# Patient Record
Sex: FEMALE | Race: BLACK OR AFRICAN AMERICAN | NOT HISPANIC OR LATINO | URBAN - METROPOLITAN AREA
[De-identification: names, ages, dates, MRNs, and addresses within clinical notes are randomized per-mention and may not be internally consistent; named-entity substitution may affect disease eponyms.]

---

## 2022-05-25 ENCOUNTER — HOSPITAL ENCOUNTER (EMERGENCY)
Facility: HOSPITAL | Age: 52
Discharge: HOME | End: 2022-05-25
Attending: EMERGENCY MEDICINE

## 2022-05-25 VITALS
HEART RATE: 85 BPM | WEIGHT: 110 LBS | RESPIRATION RATE: 16 BRPM | TEMPERATURE: 98 F | OXYGEN SATURATION: 100 % | BODY MASS INDEX: 18.78 KG/M2 | HEIGHT: 64 IN | SYSTOLIC BLOOD PRESSURE: 135 MMHG | DIASTOLIC BLOOD PRESSURE: 97 MMHG

## 2022-05-25 DIAGNOSIS — E86.0 DEHYDRATION: ICD-10-CM

## 2022-05-25 DIAGNOSIS — R42 DIZZINESS: ICD-10-CM

## 2022-05-25 DIAGNOSIS — R11.10 VOMITING: ICD-10-CM

## 2022-05-25 DIAGNOSIS — N30.01 ACUTE CYSTITIS WITH HEMATURIA: Primary | ICD-10-CM

## 2022-05-25 DIAGNOSIS — R53.81 MALAISE: ICD-10-CM

## 2022-05-25 LAB
ALBUMIN SERPL BCP-MCNC: 4.4 G/DL (ref 3.5–5.2)
ALP SERPL-CCNC: 67 U/L (ref 55–135)
ALT SERPL W/O P-5'-P-CCNC: 9 U/L (ref 10–44)
ANION GAP SERPL CALC-SCNC: 13 MMOL/L (ref 8–16)
AST SERPL-CCNC: 15 U/L (ref 10–40)
BACTERIA #/AREA URNS AUTO: ABNORMAL /HPF
BASOPHILS # BLD AUTO: 0.07 K/UL (ref 0–0.2)
BASOPHILS NFR BLD: 0.8 % (ref 0–1.9)
BILIRUB SERPL-MCNC: 1.5 MG/DL (ref 0.1–1)
BILIRUB UR QL STRIP: NEGATIVE
BUN SERPL-MCNC: 6 MG/DL (ref 6–20)
CALCIUM SERPL-MCNC: 10.4 MG/DL (ref 8.7–10.5)
CHLORIDE SERPL-SCNC: 101 MMOL/L (ref 95–110)
CLARITY UR REFRACT.AUTO: CLEAR
CO2 SERPL-SCNC: 26 MMOL/L (ref 23–29)
COLOR UR AUTO: YELLOW
CREAT SERPL-MCNC: 0.7 MG/DL (ref 0.5–1.4)
CTP QC/QA: YES
CTP QC/QA: YES
DIFFERENTIAL METHOD: ABNORMAL
EOSINOPHIL # BLD AUTO: 0.1 K/UL (ref 0–0.5)
EOSINOPHIL NFR BLD: 1.2 % (ref 0–8)
ERYTHROCYTE [DISTWIDTH] IN BLOOD BY AUTOMATED COUNT: 11.5 % (ref 11.5–14.5)
EST. GFR  (AFRICAN AMERICAN): >60 ML/MIN/1.73 M^2
EST. GFR  (NON AFRICAN AMERICAN): >60 ML/MIN/1.73 M^2
GLUCOSE SERPL-MCNC: 105 MG/DL (ref 70–110)
GLUCOSE UR QL STRIP: NEGATIVE
HCT VFR BLD AUTO: 47.2 % (ref 37–48.5)
HGB BLD-MCNC: 16.4 G/DL (ref 12–16)
HGB UR QL STRIP: ABNORMAL
IMM GRANULOCYTES # BLD AUTO: 0.02 K/UL (ref 0–0.04)
IMM GRANULOCYTES NFR BLD AUTO: 0.2 % (ref 0–0.5)
KETONES UR QL STRIP: NEGATIVE
LEUKOCYTE ESTERASE UR QL STRIP: ABNORMAL
LYMPHOCYTES # BLD AUTO: 3.4 K/UL (ref 1–4.8)
LYMPHOCYTES NFR BLD: 37.1 % (ref 18–48)
MCH RBC QN AUTO: 31.5 PG (ref 27–31)
MCHC RBC AUTO-ENTMCNC: 34.7 G/DL (ref 32–36)
MCV RBC AUTO: 91 FL (ref 82–98)
MICROSCOPIC COMMENT: ABNORMAL
MONOCYTES # BLD AUTO: 0.6 K/UL (ref 0.3–1)
MONOCYTES NFR BLD: 6.1 % (ref 4–15)
NEUTROPHILS # BLD AUTO: 5.1 K/UL (ref 1.8–7.7)
NEUTROPHILS NFR BLD: 54.6 % (ref 38–73)
NITRITE UR QL STRIP: NEGATIVE
NRBC BLD-RTO: 0 /100 WBC
PH UR STRIP: 6 [PH] (ref 5–8)
PLATELET # BLD AUTO: 286 K/UL (ref 150–450)
PMV BLD AUTO: 9.1 FL (ref 9.2–12.9)
POC MOLECULAR INFLUENZA A AGN: NEGATIVE
POC MOLECULAR INFLUENZA B AGN: NEGATIVE
POTASSIUM SERPL-SCNC: 3.5 MMOL/L (ref 3.5–5.1)
PROT SERPL-MCNC: 7.9 G/DL (ref 6–8.4)
PROT UR QL STRIP: NEGATIVE
RBC # BLD AUTO: 5.2 M/UL (ref 4–5.4)
RBC #/AREA URNS AUTO: 9 /HPF (ref 0–4)
SARS-COV-2 RDRP RESP QL NAA+PROBE: NEGATIVE
SODIUM SERPL-SCNC: 140 MMOL/L (ref 136–145)
SP GR UR STRIP: 1.01 (ref 1–1.03)
SQUAMOUS #/AREA URNS AUTO: 1 /HPF
TROPONIN I SERPL DL<=0.01 NG/ML-MCNC: <0.006 NG/ML (ref 0–0.03)
TSH SERPL DL<=0.005 MIU/L-ACNC: 3.32 UIU/ML (ref 0.4–4)
URN SPEC COLLECT METH UR: ABNORMAL
WBC # BLD AUTO: 9.28 K/UL (ref 3.9–12.7)
WBC #/AREA URNS AUTO: 42 /HPF (ref 0–5)

## 2022-05-25 PROCEDURE — 93005 ELECTROCARDIOGRAM TRACING: CPT

## 2022-05-25 PROCEDURE — 96374 THER/PROPH/DIAG INJ IV PUSH: CPT

## 2022-05-25 PROCEDURE — 84484 ASSAY OF TROPONIN QUANT: CPT | Performed by: NURSE PRACTITIONER

## 2022-05-25 PROCEDURE — 99284 EMERGENCY DEPT VISIT MOD MDM: CPT | Mod: CS,,, | Performed by: NURSE PRACTITIONER

## 2022-05-25 PROCEDURE — 99284 PR EMERGENCY DEPT VISIT,LEVEL IV: ICD-10-PCS | Mod: CS,,, | Performed by: NURSE PRACTITIONER

## 2022-05-25 PROCEDURE — 85025 COMPLETE CBC W/AUTO DIFF WBC: CPT | Performed by: NURSE PRACTITIONER

## 2022-05-25 PROCEDURE — 81001 URINALYSIS AUTO W/SCOPE: CPT | Performed by: NURSE PRACTITIONER

## 2022-05-25 PROCEDURE — 87502 INFLUENZA DNA AMP PROBE: CPT

## 2022-05-25 PROCEDURE — 93010 EKG 12-LEAD: ICD-10-PCS | Mod: ,,, | Performed by: INTERNAL MEDICINE

## 2022-05-25 PROCEDURE — 93010 ELECTROCARDIOGRAM REPORT: CPT | Mod: ,,, | Performed by: INTERNAL MEDICINE

## 2022-05-25 PROCEDURE — 99285 EMERGENCY DEPT VISIT HI MDM: CPT | Mod: 25

## 2022-05-25 PROCEDURE — 84443 ASSAY THYROID STIM HORMONE: CPT | Performed by: NURSE PRACTITIONER

## 2022-05-25 PROCEDURE — U0002 COVID-19 LAB TEST NON-CDC: HCPCS | Performed by: NURSE PRACTITIONER

## 2022-05-25 PROCEDURE — 63600175 PHARM REV CODE 636 W HCPCS: Performed by: NURSE PRACTITIONER

## 2022-05-25 PROCEDURE — 87086 URINE CULTURE/COLONY COUNT: CPT | Performed by: NURSE PRACTITIONER

## 2022-05-25 PROCEDURE — 25000003 PHARM REV CODE 250: Performed by: NURSE PRACTITIONER

## 2022-05-25 PROCEDURE — 80053 COMPREHEN METABOLIC PANEL: CPT | Performed by: NURSE PRACTITIONER

## 2022-05-25 RX ORDER — CEPHALEXIN 500 MG/1
500 CAPSULE ORAL EVERY 12 HOURS
Qty: 10 CAPSULE | Refills: 0 | Status: SHIPPED | OUTPATIENT
Start: 2022-05-25 | End: 2022-05-30

## 2022-05-25 RX ORDER — AMLODIPINE BESYLATE 5 MG/1
5 TABLET ORAL DAILY
Qty: 30 TABLET | Refills: 0 | Status: SHIPPED | OUTPATIENT
Start: 2022-05-25 | End: 2022-06-24

## 2022-05-25 RX ADMIN — SODIUM CHLORIDE 1000 ML: 0.9 INJECTION, SOLUTION INTRAVENOUS at 10:05

## 2022-05-25 RX ADMIN — CEFTRIAXONE 1 G: 1 INJECTION, SOLUTION INTRAVENOUS at 10:05

## 2022-05-26 NOTE — ED TRIAGE NOTES
"Zara Monte, a 51 y.o. female presents to the ED w/ complaint of "feeling off." pt states about 2 weeks ago she had a few episodes of vomiting. Pt c/o dizziness, burred vision, and headache.     Triage note:  Chief Complaint   Patient presents with    Headache     Pt reports headache that started today. +nausea, + intermittent dizziness with blurry vision.       Review of patient's allergies indicates:  No Known Allergies  No past medical history on file.    "

## 2022-05-26 NOTE — DISCHARGE INSTRUCTIONS
You were seen and evaluated in the ER today.  Your workup shows that you have a urinary tract infection and your slightly dehydrated.  We have given you fluids and antibiotics in the ED.  We are going to discharge you with antibiotics.  Please take as prescribed.  Please increase your fluids over the next few days.  Please follow-up with your PCP as needed.  Please return to the ED for any worsening symptoms such as chest pain, shortness of breath, fever not controlled with Tylenol or ibuprofen or uncontrolled pain.      Our goal in the emergency department is to always give you outstanding care and exceptional service. You may receive a survey by mail or e-mail in the next week regarding your experience in our ED. We would greatly appreciate your completing and returning the survey. Your feedback provides us with a way to recognize our staff who give very good care and it helps us learn how to improve when your experience was below our aspiration of excellence.

## 2022-05-26 NOTE — ED PROVIDER NOTES
"Source of History:  Patient    Chief complaint:  Headache (Pt reports headache that started today. +nausea, + intermittent dizziness with blurry vision.  )      HPI:  Zara Monte is a 51 y.o. female presenting with nausea, intermittent dizziness and generalized malaise for the past few weeks, worse over the past week. Pt does endorse nausea and vomiting intermittently over the past week.  Pt is not covid or influenza vaccinated.  Pt denies any sick contacts    This is the extent to the patients complaints today here in the emergency department.    ROS: As per HPI and below:  Constitutional:  Positive for generalized malaise.  Positive for appetite change No fever no chills  Eyes: No visual changes.  ENT:  No sore throat.  No ear pain  Cardiovascular: No chest pain.  Respiratory: No shortness of breath.  No cough   GI: No abdominal pain.  No nausea or vomiting.  Genito-Urinary: No abnormal urination.  Neurologic: No focal weakness.  No numbness.  Positive for headache.  Positive for dizziness  MSK: no back pain.  Positive for generalized body aches  Integument: No rashes or lesions.  Hematologic: No easy bruising.  Endocrine: No excessive thirst or urination.    Review of patient's allergies indicates:  No Known Allergies    PMH:  As per HPI and below:  Past Medical History:   Diagnosis Date    Hypertension      History reviewed. No pertinent surgical history.    Social History     Tobacco Use    Smoking status: Never Smoker    Smokeless tobacco: Never Used   Substance Use Topics    Alcohol use: Not Currently    Drug use: Never       Physical Exam:    BP (S) (!) 135/97 (Patient Position: Standing)   Pulse (S) 91   Temp 97.6 °F (36.4 °C) (Oral)   Resp 16   Ht 5' 4" (1.626 m)   Wt 49.9 kg (110 lb)   SpO2 99%   Breastfeeding No   BMI 18.88 kg/m²   Nursing note and vital signs reviewed.  Constitutional: No acute distress.  Nontoxic.  Vital signs stable  Eyes: No conjunctival injection.  Extraocular " muscles are intact.  Pupils equal round reactive 3-2 mm.  No nystagmus noted.    ENT: Oropharynx clear. No tonisillar exudate or swelling. Uvula midline and normal. No elevation of posterior oropharynx.   Cardiovascular: Regular rate and rhythm.  No murmurs. No gallops. No rubs.  Respiratory: Clear to auscultation bilaterally.  Good air movement.  No wheezes.  No rhonchi. No rales. No accessory muscle use.  Abdomen: Soft.  Not distended.  Nontender.  No guarding.  No rebound. Non-peritoneal.  Musculoskeletal: Good range of motion all joints.  No deformities.  Neck supple.  No meningismus.  Skin: No rashes seen.  Good turgor.  No abrasions.  No ecchymoses.  Neuro: alert and oriented x3,  no focal neurological deficits.  Psych: Appropriate, conversant    Labs that have been ordered have been independently reviewed and interpreted by myself.      MDM/ Differential Dx:  Emergent evaluation of a 50 yo female presenting for generalized malaise and feeling bad over the past few weeks.  Patient states over the week she has had intermittent nausea, vomiting and headache.  Patient states she is compliant with her blood pressure medications.  Patient denies any blurred vision, chest pain or shortness of breath.  On exam pt is A&Ox3. VSS. Nonfebrile and nontoxic appearing.  Pupils equal round reactive 3-2 mm.  No nystagmus noted.  Breath sounds clear bilaterally. Mucous membranes pink and moist. Abdomen soft and nontender. No rebound or guarding appreciated on exam.   BS WNL.  Pt speaking in full sentences.  Steady gait appreciated. Cap refill < 3 seconds.      Differential diagnoses include but are not limited to viral URI including influenza, coronavirus, bronchitis, pneumonia, UTI, dehydration, others.      I will get labs, imaging, hydrate and reassess.            Diagnostic Impression:    1. Acute cystitis with hematuria    2. Dizziness    3. Vomiting    4. Malaise    5. Dehydration         ED Disposition Condition     Discharge Stable          ED Prescriptions     Medication Sig Dispense Start Date End Date Auth. Provider    cephALEXin (KEFLEX) 500 MG capsule Take 1 capsule (500 mg total) by mouth every 12 (twelve) hours. for 5 days 10 capsule 5/25/2022 5/30/2022 Katharine Mccain NP    amLODIPine (NORVASC) 5 MG tablet Take 1 tablet (5 mg total) by mouth once daily. 30 tablet 5/25/2022 6/24/2022 Katharine Mccain NP        Follow-up Information     Follow up With Specialties Details Why Contact Info    [facility] Internal Medicine, Family Medicine Schedule an appointment as soon as possible for a visit  As needed [address] [facility] [address]  [phone]               Katharine Mccain NP  05/25/22 9616

## 2022-05-27 LAB — BACTERIA UR CULT: NO GROWTH

## 2022-05-28 ENCOUNTER — HOSPITAL ENCOUNTER (EMERGENCY)
Facility: HOSPITAL | Age: 52
Discharge: HOME | End: 2022-05-28
Attending: EMERGENCY MEDICINE

## 2022-05-28 VITALS
DIASTOLIC BLOOD PRESSURE: 85 MMHG | OXYGEN SATURATION: 100 % | BODY MASS INDEX: 18.78 KG/M2 | RESPIRATION RATE: 16 BRPM | HEART RATE: 83 BPM | HEIGHT: 64 IN | TEMPERATURE: 98 F | WEIGHT: 110 LBS | SYSTOLIC BLOOD PRESSURE: 172 MMHG

## 2022-05-28 DIAGNOSIS — N73.0 PID (ACUTE PELVIC INFLAMMATORY DISEASE): ICD-10-CM

## 2022-05-28 DIAGNOSIS — R40.20 LOSS OF CONSCIOUSNESS: ICD-10-CM

## 2022-05-28 DIAGNOSIS — R55 SYNCOPE, UNSPECIFIED SYNCOPE TYPE: Primary | ICD-10-CM

## 2022-05-28 LAB
ALBUMIN SERPL BCP-MCNC: 4 G/DL (ref 3.5–5.2)
ALP SERPL-CCNC: 67 U/L (ref 55–135)
ALT SERPL W/O P-5'-P-CCNC: 7 U/L (ref 10–44)
ANION GAP SERPL CALC-SCNC: 10 MMOL/L (ref 8–16)
AST SERPL-CCNC: 15 U/L (ref 10–40)
B-HCG UR QL: NEGATIVE
BACTERIA #/AREA URNS AUTO: ABNORMAL /HPF
BACTERIA GENITAL QL WET PREP: ABNORMAL
BASOPHILS # BLD AUTO: 0.04 K/UL (ref 0–0.2)
BASOPHILS NFR BLD: 0.5 % (ref 0–1.9)
BILIRUB SERPL-MCNC: 0.7 MG/DL (ref 0.1–1)
BILIRUB UR QL STRIP: NEGATIVE
BUN SERPL-MCNC: 9 MG/DL (ref 6–20)
CALCIUM SERPL-MCNC: 9.7 MG/DL (ref 8.7–10.5)
CHLORIDE SERPL-SCNC: 107 MMOL/L (ref 95–110)
CLARITY UR REFRACT.AUTO: ABNORMAL
CLUE CELLS VAG QL WET PREP: ABNORMAL
CO2 SERPL-SCNC: 22 MMOL/L (ref 23–29)
COLOR UR AUTO: YELLOW
CREAT SERPL-MCNC: 0.8 MG/DL (ref 0.5–1.4)
CTP QC/QA: YES
CTP QC/QA: YES
DIFFERENTIAL METHOD: ABNORMAL
EOSINOPHIL # BLD AUTO: 0.1 K/UL (ref 0–0.5)
EOSINOPHIL NFR BLD: 1.3 % (ref 0–8)
ERYTHROCYTE [DISTWIDTH] IN BLOOD BY AUTOMATED COUNT: 11.6 % (ref 11.5–14.5)
EST. GFR  (AFRICAN AMERICAN): >60 ML/MIN/1.73 M^2
EST. GFR  (NON AFRICAN AMERICAN): >60 ML/MIN/1.73 M^2
FILAMENT FUNGI VAG WET PREP-#/AREA: ABNORMAL
GLUCOSE SERPL-MCNC: 103 MG/DL (ref 70–110)
GLUCOSE UR QL STRIP: ABNORMAL
HCT VFR BLD AUTO: 48.1 % (ref 37–48.5)
HGB BLD-MCNC: 16.2 G/DL (ref 12–16)
HGB UR QL STRIP: NEGATIVE
HYALINE CASTS UR QL AUTO: 0 /LPF
IMM GRANULOCYTES # BLD AUTO: 0.02 K/UL (ref 0–0.04)
IMM GRANULOCYTES NFR BLD AUTO: 0.2 % (ref 0–0.5)
KETONES UR QL STRIP: NEGATIVE
LEUKOCYTE ESTERASE UR QL STRIP: ABNORMAL
LYMPHOCYTES # BLD AUTO: 1.7 K/UL (ref 1–4.8)
LYMPHOCYTES NFR BLD: 19.8 % (ref 18–48)
MCH RBC QN AUTO: 32 PG (ref 27–31)
MCHC RBC AUTO-ENTMCNC: 33.7 G/DL (ref 32–36)
MCV RBC AUTO: 95 FL (ref 82–98)
MICROSCOPIC COMMENT: ABNORMAL
MONOCYTES # BLD AUTO: 0.5 K/UL (ref 0.3–1)
MONOCYTES NFR BLD: 5.9 % (ref 4–15)
NEUTROPHILS # BLD AUTO: 6.2 K/UL (ref 1.8–7.7)
NEUTROPHILS NFR BLD: 72.3 % (ref 38–73)
NITRITE UR QL STRIP: NEGATIVE
NRBC BLD-RTO: 0 /100 WBC
PH UR STRIP: 5 [PH] (ref 5–8)
PLATELET # BLD AUTO: 269 K/UL (ref 150–450)
PMV BLD AUTO: 9.5 FL (ref 9.2–12.9)
POTASSIUM SERPL-SCNC: 3.9 MMOL/L (ref 3.5–5.1)
PROT SERPL-MCNC: 7 G/DL (ref 6–8.4)
PROT UR QL STRIP: ABNORMAL
RBC # BLD AUTO: 5.06 M/UL (ref 4–5.4)
RBC #/AREA URNS AUTO: 21 /HPF (ref 0–4)
SARS-COV-2 RDRP RESP QL NAA+PROBE: NEGATIVE
SODIUM SERPL-SCNC: 139 MMOL/L (ref 136–145)
SP GR UR STRIP: 1.02 (ref 1–1.03)
SPECIMEN SOURCE: ABNORMAL
SQUAMOUS #/AREA URNS AUTO: 1 /HPF
T VAGINALIS GENITAL QL WET PREP: ABNORMAL
TROPONIN I SERPL DL<=0.01 NG/ML-MCNC: 0.01 NG/ML (ref 0–0.03)
URN SPEC COLLECT METH UR: ABNORMAL
WBC # BLD AUTO: 8.54 K/UL (ref 3.9–12.7)
WBC #/AREA URNS AUTO: 41 /HPF (ref 0–5)
WBC #/AREA VAG WET PREP: ABNORMAL
YEAST GENITAL QL WET PREP: ABNORMAL

## 2022-05-28 PROCEDURE — 93010 EKG 12-LEAD: ICD-10-PCS | Mod: ,,, | Performed by: INTERNAL MEDICINE

## 2022-05-28 PROCEDURE — 87210 SMEAR WET MOUNT SALINE/INK: CPT | Performed by: EMERGENCY MEDICINE

## 2022-05-28 PROCEDURE — U0002 COVID-19 LAB TEST NON-CDC: HCPCS | Performed by: EMERGENCY MEDICINE

## 2022-05-28 PROCEDURE — 81025 URINE PREGNANCY TEST: CPT | Performed by: EMERGENCY MEDICINE

## 2022-05-28 PROCEDURE — 87086 URINE CULTURE/COLONY COUNT: CPT | Performed by: EMERGENCY MEDICINE

## 2022-05-28 PROCEDURE — 99285 EMERGENCY DEPT VISIT HI MDM: CPT | Mod: 25

## 2022-05-28 PROCEDURE — 87591 N.GONORRHOEAE DNA AMP PROB: CPT | Performed by: EMERGENCY MEDICINE

## 2022-05-28 PROCEDURE — 86803 HEPATITIS C AB TEST: CPT | Performed by: EMERGENCY MEDICINE

## 2022-05-28 PROCEDURE — 99285 EMERGENCY DEPT VISIT HI MDM: CPT | Mod: CS,,, | Performed by: EMERGENCY MEDICINE

## 2022-05-28 PROCEDURE — 93005 ELECTROCARDIOGRAM TRACING: CPT

## 2022-05-28 PROCEDURE — 85025 COMPLETE CBC W/AUTO DIFF WBC: CPT | Performed by: EMERGENCY MEDICINE

## 2022-05-28 PROCEDURE — 99285 PR EMERGENCY DEPT VISIT,LEVEL V: ICD-10-PCS | Mod: CS,,, | Performed by: EMERGENCY MEDICINE

## 2022-05-28 PROCEDURE — 80053 COMPREHEN METABOLIC PANEL: CPT | Performed by: EMERGENCY MEDICINE

## 2022-05-28 PROCEDURE — 25000003 PHARM REV CODE 250: Performed by: EMERGENCY MEDICINE

## 2022-05-28 PROCEDURE — 87491 CHLMYD TRACH DNA AMP PROBE: CPT | Performed by: EMERGENCY MEDICINE

## 2022-05-28 PROCEDURE — 81001 URINALYSIS AUTO W/SCOPE: CPT | Performed by: EMERGENCY MEDICINE

## 2022-05-28 PROCEDURE — 84484 ASSAY OF TROPONIN QUANT: CPT | Performed by: EMERGENCY MEDICINE

## 2022-05-28 PROCEDURE — 93010 ELECTROCARDIOGRAM REPORT: CPT | Mod: ,,, | Performed by: INTERNAL MEDICINE

## 2022-05-28 PROCEDURE — 87389 HIV-1 AG W/HIV-1&-2 AB AG IA: CPT | Performed by: EMERGENCY MEDICINE

## 2022-05-28 RX ORDER — DOXYCYCLINE HYCLATE 100 MG
100 TABLET ORAL
Status: COMPLETED | OUTPATIENT
Start: 2022-05-28 | End: 2022-05-28

## 2022-05-28 RX ORDER — METRONIDAZOLE 500 MG/1
500 TABLET ORAL EVERY 12 HOURS
Qty: 14 TABLET | Refills: 0 | Status: SHIPPED | OUTPATIENT
Start: 2022-05-28 | End: 2022-06-04

## 2022-05-28 RX ORDER — METRONIDAZOLE 500 MG/1
500 TABLET ORAL
Status: COMPLETED | OUTPATIENT
Start: 2022-05-28 | End: 2022-05-28

## 2022-05-28 RX ORDER — DOXYCYCLINE 100 MG/1
100 CAPSULE ORAL 2 TIMES DAILY
Qty: 20 CAPSULE | Refills: 0 | Status: SHIPPED | OUTPATIENT
Start: 2022-05-28 | End: 2022-06-07

## 2022-05-28 RX ORDER — AMITRIPTYLINE HYDROCHLORIDE 25 MG/1
25 TABLET, FILM COATED ORAL NIGHTLY PRN
COMMUNITY

## 2022-05-28 RX ADMIN — DOXYCYCLINE HYCLATE 100 MG: 100 TABLET, COATED ORAL at 10:05

## 2022-05-28 RX ADMIN — METRONIDAZOLE 500 MG: 500 TABLET ORAL at 11:05

## 2022-05-28 NOTE — ED PROVIDER NOTES
"Encounter Date: 5/28/2022       History     Chief Complaint   Patient presents with    Loss of Consciousness     States experiencing chest pressure and per family patient "passed out" patient states here 3 days ago for UTI and fatigue. Patient reports starting antibiotics yesterday     50 y/o f, h/o HTN, c/o syncope episode this am, occurred when got up to walk to the bathroom, preceded by feeling lightheaded, sister was with her, states that had brief LOC, "about 7 seconds," no witnessed seizure activity or incontinence.  Pt reports multiple complaints over the past 3 days.  Seen here initially 3 days ago for generalized weakness, dizziness, n/v.  She had an extensive ED work-up, UA was abnormal so she was treated for a UTI, given ceftriaxone 1 g IV and d/c'd w keflex which she has been taking.  Of note, her urine culture is negative.  Denies any urinary sx but states that she does have abnormal vag discharge and a new sexual partner.  Also reports intermittent CP over the past 2 days, brief episodes.  No CP prior to syncope episode.  No LE pain/edema.  Also with intermittent head pressure.    On further discussion with pt later in visit, she notes that she was previously rx'd metoprolol and then this was d/c'd and she was told to take amlodipine 5 mg daily.  She has taken neither of these meds for a long time and then yesterday evening, took both metoprolol 50 mg and amlodipine 5 mg together - and then in the middle of the night took a amitriptyline for anxiety.          Review of patient's allergies indicates:  No Known Allergies  Past Medical History:   Diagnosis Date    Hypertension      No past surgical history on file.  No family history on file.  Social History     Tobacco Use    Smoking status: Never Smoker    Smokeless tobacco: Never Used   Substance Use Topics    Alcohol use: Not Currently    Drug use: Never     Review of Systems   Constitutional: Positive for fatigue. Negative for chills, " diaphoresis and fever.   Respiratory: Negative for cough and shortness of breath.    Cardiovascular: Positive for chest pain. Negative for palpitations and leg swelling.   Gastrointestinal: Positive for nausea. Negative for abdominal pain, diarrhea and vomiting.   Genitourinary: Positive for vaginal discharge. Negative for dysuria, vaginal bleeding and vaginal pain.   Musculoskeletal: Negative for gait problem.   Neurological: Positive for dizziness, syncope and light-headedness. Negative for seizures, speech difficulty and headaches.   Psychiatric/Behavioral: Negative for confusion.   All other systems reviewed and are negative.      Physical Exam     Initial Vitals [05/28/22 0815]   BP Pulse Resp Temp SpO2   111/67 71 15 97.5 °F (36.4 °C) 100 %      MAP       --         Physical Exam    Nursing note and vitals reviewed.  Constitutional: Vital signs are normal. She appears well-developed and well-nourished. She is not diaphoretic.  Non-toxic appearance. She does not appear ill. No distress.   HENT:   Head: Normocephalic and atraumatic.   Mouth/Throat: Oropharynx is clear and moist and mucous membranes are normal. Mucous membranes are not dry.   Eyes: Conjunctivae and lids are normal.   Neck: Neck supple.   Normal range of motion.  Cardiovascular: Normal rate, regular rhythm and normal heart sounds.   No murmur heard.  Pulmonary/Chest: Breath sounds normal. No respiratory distress.   Abdominal: Abdomen is soft. She exhibits no distension. There is no abdominal tenderness. There is no rebound and no guarding.   Genitourinary:    Uterus normal.      Pelvic exam was performed with patient supine.   There is no rash or lesion on the right labia. There is no rash or lesion on the left labia. Uterus is not tender. Cervix exhibits discharge and friability. Cervix exhibits no motion tenderness. Right adnexum displays no tenderness. Left adnexum displays no tenderness.    Vaginal discharge present.      No vaginal bleeding.    No bleeding in the vagina.    Genitourinary Comments: Copious yellow-green discharge in vaginal canal and coming from cervical os     Musculoskeletal:         General: No tenderness or edema.      Cervical back: Normal range of motion and neck supple.     Neurological: She is alert and oriented to person, place, and time.   Skin: Skin is warm, dry and intact. No pallor.   Psychiatric: She has a normal mood and affect. Her speech is normal and behavior is normal.         ED Course   Procedures  Labs Reviewed   VAGINAL SCREEN - Abnormal; Notable for the following components:       Result Value    Trichomonas Occasional (*)     Clue Cells Rare (*)     WBC - Vaginal Screen Few (*)     Bacteria - Vaginal Screen Occasional (*)     All other components within normal limits    Narrative:     Release to patient->Immediate   CBC W/ AUTO DIFFERENTIAL - Abnormal; Notable for the following components:    Hemoglobin 16.2 (*)     MCH 32.0 (*)     All other components within normal limits   URINALYSIS, REFLEX TO URINE CULTURE - Abnormal; Notable for the following components:    Appearance, UA Hazy (*)     Protein, UA 1+ (*)     Glucose, UA 1+ (*)     Leukocytes, UA 3+ (*)     All other components within normal limits    Narrative:     Specimen Source->Urine   URINALYSIS MICROSCOPIC - Abnormal; Notable for the following components:    RBC, UA 21 (*)     WBC, UA 41 (*)     Bacteria Few (*)     All other components within normal limits    Narrative:     Specimen Source->Urine   COMPREHENSIVE METABOLIC PANEL - Abnormal; Notable for the following components:    CO2 22 (*)     ALT 7 (*)     All other components within normal limits   C. TRACHOMATIS/N. GONORRHOEAE BY AMP DNA   CULTURE, URINE   TROPONIN I   HIV 1 / 2 ANTIBODY   HEPATITIS C ANTIBODY   SARS-COV-2 RDRP GENE    Narrative:     This test utilizes isothermal nucleic acid amplification   technology to detect the SARS-CoV-2 RdRp nucleic acid segment.   The analytical sensitivity  (limit of detection) is 125 genome   equivalents/mL.   A POSITIVE result implies infection with the SARS-CoV-2 virus;   the patient is presumed to be contagious.     A NEGATIVE result means that SARS-CoV-2 nucleic acids are not   present above the limit of detection. A NEGATIVE result should be   treated as presumptive. It does not rule out the possibility of   COVID-19 and should not be the sole basis for treatment decisions.   If COVID-19 is strongly suspected based on clinical and exposure   history, re-testing using an alternate molecular assay should be   considered.   This test is only for use under the Food and Drug   Administration s Emergency Use Authorization (EUA).   Commercial kits are provided by [employer].   Performance characteristics of the EUA have been independently   verified by [facility] Department of   Pathology and Laboratory Medicine.   _________________________________________________________________   The authorized Fact Sheet for Healthcare Providers and the authorized Fact   Sheet for Patients of the ID NOW COVID-19 are available on the FDA   website:     https://www.fda.gov/media/179214/download  https://www.fda.gov/media/444099/download          POCT URINE PREGNANCY        ECG Results          EKG 12-lead (Final result)  Result time 05/28/22 10:05:54    Final result by Interface, Lab In [facility] (05/28/22 10:05:54)                 Narrative:    Test Reason : R40.20,    Vent. Rate : 077 BPM     Atrial Rate : 077 BPM     P-R Int : 144 ms          QRS Dur : 082 ms      QT Int : 368 ms       P-R-T Axes : 080 061 055 degrees     QTc Int : 416 ms    Normal sinus rhythm  Minimal voltage criteria for LVH, may be normal variant ( Sokolow-Ortega )  Nonspecific T wave abnormality  Abnormal ECG  When compared with ECG of 25-MAY-2022 23:08,  Nonspecific T wave abnormality now evident in Inferior leads  Nonspecific T wave abnormality now evident in Lateral leads  Confirmed by DON  Natalio UGALDE ([id number]) on 5/28/2022 10:05:45 AM    Referred By: [email or web address]   SELF           Confirmed By:Natalio OCHOA MD                            Imaging Results          X-Ray Chest AP Portable (Final result)  Result time 05/28/22 09:46:52    Final result by Jodi Alanis MD (05/28/22 09:46:52)                 Impression:      Clear lungs.      Electronically signed by: Jodi Alanis MD  Date:    05/28/2022  Time:    09:46             Narrative:    EXAMINATION:  XR CHEST AP PORTABLE    CLINICAL HISTORY:  chest pain;    TECHNIQUE:  Single frontal view of the chest was performed.    COMPARISON:  05/25/2022    FINDINGS:  The mediastinal structures are midline.  The heart is not enlarged.  The lungs appear grossly clear.                                 Medications   sodium chloride 0.9% bolus 1,000 mL (0 mLs Intravenous Stopped 5/28/22 1000)   doxycycline tablet 100 mg (100 mg Oral Given 5/28/22 1018)   metroNIDAZOLE tablet 500 mg (500 mg Oral Given 5/28/22 1138)     Medical Decision Making:   History:   Old Medical Records: I decided to obtain old medical records.  Initial Assessment:   HD stable  Clinically well-appearing    Pelvic exam with significant abnormalities as noted    EKG with slight flattening of t waves  Differential Diagnosis:   This patient's clinical presentation is c/w syncope vs near-syncope.  DDx for syncope or near-syncope would include serious causes such as a dangerous arrhythmia like V tach or 3rd degree heart block, orthostatic hypotension 2/2 polypharmacy, dehydration, PE, blood loss/hemorrhage or infection/sepsis, seizure or a more benign process like vasovagal syncope.     In terms of the most likely etiology of pt's syncope, have a very strong suspicion for hypotension triggered by polypharmacy and or bradycardia triggered by polypharmacy given that pt took but a CCB and Bblocker last night together, though she is no longer rx'd the Bblocker and hasn't taken iether in months    In terms  of abnormal vag discharge and abnormal pelvic exam in setting of new sexual partner, have high concern for STI like GC or Chl.  Pt has no tenderness on exam but given presence of discharge from os, would treat as PID.  Already received ceftriaxone 3 days ago, will add doxy coverage and d/c keflex  Clinical Tests:   Lab Tests: Ordered and Reviewed  Radiological Study: Ordered and Reviewed  Medical Tests: Ordered and Reviewed  ED Management:  Labs  Cardiac monitor  Will need to perform pelvic exam given new vag discharge with new sexual partner to r/o PID  dispo uncertain pending ED work-up    11:24 AM  Labs reviewed, unremarkable except for + trich on vag prep  After long d/w pt and her relative, seems like clear trigger for syncope identified in terms of polypharmacy issue yesterday evening and pt assure me that she will not take the metoprolol any longer and will only take the 5 mg of amlodipine moving forward.  She is feeling better and is comfortable w plan for discharge home.  She has a PCP in [place] who she can f/u with                      Clinical Impression:   Final diagnoses:  [R40.20] Loss of consciousness  [R55] Syncope, unspecified syncope type (Primary)  [N73.0] PID (acute pelvic inflammatory disease)          ED Disposition Condition    Discharge Stable        ED Prescriptions     Medication Sig Dispense Start Date End Date Auth. Provider    doxycycline (VIBRAMYCIN) 100 MG Cap Take 1 capsule (100 mg total) by mouth 2 (two) times daily. for 10 days 20 capsule 5/28/2022 6/7/2022 Sera Power MD    metroNIDAZOLE (FLAGYL) 500 MG tablet Take 1 tablet (500 mg total) by mouth every 12 (twelve) hours. for 7 days 14 tablet 5/28/2022 6/4/2022 Sera Power MD        Follow-up Information    None          Sera Power MD  05/28/22 [id number]

## 2022-05-29 LAB — BACTERIA UR CULT: NO GROWTH

## 2022-05-30 LAB
HCV AB SERPL QL IA: NEGATIVE
HIV 1+2 AB+HIV1 P24 AG SERPL QL IA: NEGATIVE

## 2022-05-31 LAB
C TRACH DNA SPEC QL NAA+PROBE: NOT DETECTED
N GONORRHOEA DNA SPEC QL NAA+PROBE: NOT DETECTED

## 2024-09-13 ENCOUNTER — HOSPITAL ENCOUNTER (EMERGENCY)
Facility: OTHER | Age: 54
Discharge: HOME | End: 2024-09-13
Attending: EMERGENCY MEDICINE

## 2024-09-13 VITALS
DIASTOLIC BLOOD PRESSURE: 80 MMHG | OXYGEN SATURATION: 99 % | RESPIRATION RATE: 16 BRPM | BODY MASS INDEX: 19.63 KG/M2 | SYSTOLIC BLOOD PRESSURE: 168 MMHG | WEIGHT: 115 LBS | TEMPERATURE: 98 F | HEIGHT: 64 IN | HEART RATE: 70 BPM

## 2024-09-13 DIAGNOSIS — R35.0 URINARY FREQUENCY: Primary | ICD-10-CM

## 2024-09-13 DIAGNOSIS — R39.15 URINARY URGENCY: ICD-10-CM

## 2024-09-13 DIAGNOSIS — R10.84 GENERALIZED ABDOMINAL PAIN: ICD-10-CM

## 2024-09-13 LAB
AMORPH CRY URNS QL MICRO: ABNORMAL
BACTERIA #/AREA URNS HPF: ABNORMAL /HPF
BILIRUB UR QL STRIP: NEGATIVE
CLARITY UR: ABNORMAL
COLOR UR: ABNORMAL
GLUCOSE UR QL STRIP: NEGATIVE
HGB UR QL STRIP: ABNORMAL
KETONES UR QL STRIP: ABNORMAL
LEUKOCYTE ESTERASE UR QL STRIP: ABNORMAL
MICROSCOPIC COMMENT: ABNORMAL
NITRITE UR QL STRIP: NEGATIVE
PH UR STRIP: 6 [PH] (ref 5–8)
PROT UR QL STRIP: ABNORMAL
RBC #/AREA URNS HPF: 0 /HPF (ref 0–4)
SP GR UR STRIP: 1.03 (ref 1–1.03)
SQUAMOUS #/AREA URNS HPF: 2 /HPF
URN SPEC COLLECT METH UR: ABNORMAL
UROBILINOGEN UR STRIP-ACNC: NEGATIVE EU/DL
WBC #/AREA URNS HPF: 5 /HPF (ref 0–5)

## 2024-09-13 PROCEDURE — 25000003 PHARM REV CODE 250

## 2024-09-13 PROCEDURE — 99283 EMERGENCY DEPT VISIT LOW MDM: CPT

## 2024-09-13 PROCEDURE — 81000 URINALYSIS NONAUTO W/SCOPE: CPT

## 2024-09-13 RX ORDER — KETOROLAC TROMETHAMINE 10 MG/1
10 TABLET, FILM COATED ORAL
Status: COMPLETED | OUTPATIENT
Start: 2024-09-13 | End: 2024-09-13

## 2024-09-13 RX ORDER — NITROFURANTOIN 25; 75 MG/1; MG/1
100 CAPSULE ORAL 2 TIMES DAILY
Qty: 10 CAPSULE | Refills: 0 | Status: SHIPPED | OUTPATIENT
Start: 2024-09-13 | End: 2024-09-18

## 2024-09-13 RX ADMIN — KETOROLAC TROMETHAMINE 10 MG: 10 TABLET, FILM COATED ORAL at 09:09

## 2024-09-13 NOTE — ED PROVIDER NOTES
"Encounter Date: 9/13/2024       History     Chief Complaint   Patient presents with    Abdominal Pain     Reports "warmness" on the abd and back pain. +urinary frequency. Onset of symptoms Monday      Patient is a 54 year old female with PMH of HTN presents to ED with generalized abdominal pain, back pain, and nausea x 5 days. She also reports increased urinary frequency and hesitancy that began 2 days ago. She describes the pain as "burning" and worse on the right side, stating that it radiates down. Patient has taken Ibuprofen to help alleviate the pain. Denies fever, chills, headache, dizziness, cough, SOB, vomiting, diarrhea, constipation. Also denies urinary pain, burning, hematuria, vaginal discharge.     The history is provided by the patient.     Review of patient's allergies indicates:  No Known Allergies  Past Medical History:   Diagnosis Date    Hypertension      History reviewed. No pertinent surgical history.  No family history on file.  Social History     Tobacco Use    Smoking status: Never    Smokeless tobacco: Never   Substance Use Topics    Alcohol use: Not Currently    Drug use: Never     Review of Systems  10 point ROS performed and negative except as stated in HPI   Physical Exam     Initial Vitals [09/13/24 0804]   BP Pulse Resp Temp SpO2   (!) 158/84 75 16 97.9 °F (36.6 °C) 98 %      MAP       --         Physical Exam    Vitals reviewed.  Constitutional: Vital signs are normal. She appears well-developed and well-nourished. She does not appear ill. No distress.   HENT:   Head: Normocephalic and atraumatic.   Cardiovascular:  Normal rate and regular rhythm.     Exam reveals no gallop and no friction rub.       No murmur heard.  Pulmonary/Chest: Breath sounds normal. No respiratory distress. She has no wheezes. She has no rhonchi. She has no rales. She exhibits no tenderness.   Abdominal: Abdomen is soft. She exhibits no distension and no mass. There is no abdominal tenderness.   No right CVA " tenderness.  No left CVA tenderness. There is no rebound, no guarding and negative Dickey's sign.     Neurological: She is alert and oriented to person, place, and time.   Psychiatric: She has a normal mood and affect.         ED Course   Procedures  Labs Reviewed   URINALYSIS, REFLEX TO URINE CULTURE - Abnormal       Result Value    Specimen UA Urine, Clean Catch      Color, UA Orange (*)     Appearance, UA Cloudy (*)     pH, UA 6.0      Specific Gravity, UA 1.030      Protein, UA Trace (*)     Glucose, UA Negative      Ketones, UA 1+ (*)     Bilirubin (UA) Negative      Occult Blood UA Trace (*)     Nitrite, UA Negative      Urobilinogen, UA Negative      Leukocytes, UA 2+ (*)     Narrative:     Specimen Source->Urine   URINALYSIS MICROSCOPIC - Abnormal    RBC, UA 0      WBC, UA 5      Bacteria Few (*)     Squam Epithel, UA 2      Amorphous, UA Moderate      Microscopic Comment SEE COMMENT      Narrative:     Specimen Source->Urine          Imaging Results    None          Medications   ketorolac tablet 10 mg (10 mg Oral Given 9/13/24 0952)     Medical Decision Making  Urgent evaluation of 54 year old female presents with generalized abdominal pain and urinary symptoms. Patient is in no acute distress nor ill-appearing. Abdomen soft and nondistended without tenderness. No CVA tenderness. UA ordered for suspected UTI. Less likely cholecystitis due to lack of fever, chills, nausea. Plan for pain control and labs and will reassess.     Differential diagnoses include but is not limited to:  Urinary tract infection, cholelithiasis, cholecystitis, pyelonephritis, kidney stones    Risk  Prescription drug management.               ED Course as of 09/13/24 1746   Fri Sep 13, 2024   1045 Patient reports improvement in symptoms with toradol here in the ED. UA with 2+ leukocytes and trace blood with few bacteria. Suspect more likely UTI, but possibly recently passed kidney stone given R>L pain that radiates down and presence  of blood in urine. Patient no longer menstruates. Will cover with antibiotics for UTI and continue supportive care.  Counseled on need to follow up with PCP for further evaluation if symptoms persist.  Patient educated on signs and symptoms to monitor for and when to return to ED. Patient verbalized understanding agrees with treatment plan. All questions and concerns addressed. [NICKO]      ED Course User Index  [NICKO] Nicole Castillo PA-C                     Clinical Impression:  Final diagnoses:  [R10.84] Generalized abdominal pain  [R35.0] Urinary frequency (Primary)  [R39.15] Urinary urgency          ED Disposition Condition    Discharge Stable          ED Prescriptions       Medication Sig Dispense Start Date End Date Auth. Provider    nitrofurantoin, macrocrystal-monohydrate, (MACROBID) 100 MG capsule Take 1 capsule (100 mg total) by mouth 2 (two) times daily. for 5 days 10 capsule 9/13/2024 9/18/2024 Nicole Castillo PA-C          Follow-up Information    None          Nicole Castillo PA-C  09/13/24 [id number]

## 2024-09-13 NOTE — ED TRIAGE NOTES
Pt c/o diffuse lower abdominal pain, describes it as a burning pain. Pain also associated with nausea, no vomiting. Dx with gallstones in February, but told they would pass. Denies diarrhea, constipation. AA& O x3,in no acute distress.    Xerosis Normal Treatment: I recommended application of Cetaphil or CeraVe numerous times a day going to bed to all dry areas.